# Patient Record
Sex: MALE | Race: WHITE | NOT HISPANIC OR LATINO
[De-identification: names, ages, dates, MRNs, and addresses within clinical notes are randomized per-mention and may not be internally consistent; named-entity substitution may affect disease eponyms.]

---

## 2018-11-13 PROBLEM — Z00.00 ENCOUNTER FOR PREVENTIVE HEALTH EXAMINATION: Status: ACTIVE | Noted: 2018-11-13

## 2019-02-25 ENCOUNTER — RECORD ABSTRACTING (OUTPATIENT)
Age: 79
End: 2019-02-25

## 2019-02-25 DIAGNOSIS — G47.19 OTHER HYPERSOMNIA: ICD-10-CM

## 2019-02-25 DIAGNOSIS — Z78.9 OTHER SPECIFIED HEALTH STATUS: ICD-10-CM

## 2019-02-25 DIAGNOSIS — Z80.9 FAMILY HISTORY OF MALIGNANT NEOPLASM, UNSPECIFIED: ICD-10-CM

## 2019-02-25 DIAGNOSIS — Z82.49 FAMILY HISTORY OF ISCHEMIC HEART DISEASE AND OTHER DISEASES OF THE CIRCULATORY SYSTEM: ICD-10-CM

## 2019-02-25 DIAGNOSIS — Z87.39 PERSONAL HISTORY OF OTHER DISEASES OF THE MUSCULOSKELETAL SYSTEM AND CONNECTIVE TISSUE: ICD-10-CM

## 2019-02-25 DIAGNOSIS — Z87.891 PERSONAL HISTORY OF NICOTINE DEPENDENCE: ICD-10-CM

## 2019-05-03 ENCOUNTER — APPOINTMENT (OUTPATIENT)
Dept: PULMONOLOGY | Facility: CLINIC | Age: 79
End: 2019-05-03
Payer: COMMERCIAL

## 2019-05-03 VITALS
WEIGHT: 193.98 LBS | SYSTOLIC BLOOD PRESSURE: 132 MMHG | OXYGEN SATURATION: 97 % | BODY MASS INDEX: 34.37 KG/M2 | HEART RATE: 58 BPM | DIASTOLIC BLOOD PRESSURE: 60 MMHG | HEIGHT: 63 IN

## 2019-05-03 PROCEDURE — 99215 OFFICE O/P EST HI 40 MIN: CPT

## 2019-05-03 PROCEDURE — G0296 VISIT TO DETERM LDCT ELIG: CPT

## 2019-05-03 NOTE — DISCUSSION/SUMMARY
[FreeTextEntry1] : APAP [X]  30 day [] 90 day compliance Dates:  [4/3-5/2/2019] \par Settings: [APAP 8-16] cm F[]  Airfit Touch [] small []Med []  Lg . [X]  Nasal\par %compliance:[80]  \par Pressure:  95thile%  []cm max []cm\par Average use:  [5 hours 6 minutes] \par Leak: [17] L/ min,  max [32]L/ min   [] L/ hrs  []min\par AHI = [3] \par \par

## 2019-05-03 NOTE — ASSESSMENT
[FreeTextEntry1] : above was discussed at length with the patient was an excellent understanding of the issues\par

## 2019-05-03 NOTE — HISTORY OF PRESENT ILLNESS
[Difficulty Breathing During Exertion] : stable dyspnea on exertion [Feelings Of Weakness On Exertion] : stable exercise intolerance [Cough] : stable coughing [Wheezing] : stable wheezing [Regional Soft Tissue Swelling Both Lower Extremities] : denies lower extremity edema [Fever] : denies fever [Chest Pain Or Discomfort] : denies chest pain [Wt Gain ___ Lbs] : recent [unfilled] ~Upound(s) weight gain [3  -  Moderate] : 3, moderate [Oxygen] : the patient uses no supplemental oxygen [Class III - Marked Limitation in Activity] : III [Former] : is a former smoker [___ Year Quit] : ~He/She~ quit smoking in [unfilled] [___ Pack Year History] : [unfilled] pack year history [URI] : upper respiratory tract infection [de-identified] : Interstitial lung disease increased in the right lower lobe [de-identified] : FEV1 2.09 (89% predicted FEV1/FVC 79, , %, RV/%, DLCO 90% [de-identified] : 2 PPDx 47 years [Obstructive Sleep Apnea] : obstructive sleep apnea [Snoring] : no snoring [Witnessed Apneas] : no witnessed sleep apnea [Frequent Nocturnal Awakening] : no nocturnal awakening [Daytime Somnolence] : no daytime somnolence [ESS: ___] : ESS score [unfilled] [Date: ___] : Date of most recent diagnostic polysomnogram: [unfilled] [Dwight desatuation%: ___] : Dwight desaturation:  [unfilled]% [AHI: ___ per hour] : Apnea-hypopnea index:  [unfilled] per hour [CPAP: ___ cmH2O] : CPAP: [unfilled] cmH2O [FreeTextEntry1] : the patient returns with his wife for followup of his COPD JUVENTINO and obesity. he had a bad cough for approximately 2 weeks but is feeling better now addition he noticed that he gets SOB walking off aspirin certainly very dyspneic walking uphills. he has gained weight despite watching his p.o. intake and decreasing his wine consumption significant. he also complains of abdominal distention at times. e has been compliant since he's been using it.

## 2019-05-03 NOTE — PHYSICAL EXAM
[General Appearance - In No Acute Distress] : no acute distress [Elongated Uvula] : elongated uvula [Erythema] : erythema of the pharynx [Enlarged Base of the Tongue] : enlargement of the base of the tongue [IV] : IV [Heart Rate And Rhythm] : heart rate and rhythm were normal [] : the neck was supple [Neck Appearance] : the appearance of the neck was normal [Auscultation Breath Sounds / Voice Sounds] : lungs were clear to auscultation bilaterally [Respiration, Rhythm And Depth] : normal respiratory rhythm and effort [Abdomen Soft] : soft [Nail Clubbing] : no clubbing of the fingernails [Abnormal Walk] : normal gait [FreeTextEntry1] : No edema [Cyanosis, Localized] : no localized cyanosis [Cranial Nerves] : cranial nerves 2-12 were intact [Deep Tendon Reflexes (DTR)] : deep tendon reflexes were 2+ and symmetric [Skin Color & Pigmentation] : normal skin color and pigmentation [Oriented To Time, Place, And Person] : oriented to person, place, and time

## 2019-06-11 ENCOUNTER — RX RENEWAL (OUTPATIENT)
Age: 79
End: 2019-06-11

## 2019-11-13 ENCOUNTER — RESULT REVIEW (OUTPATIENT)
Age: 79
End: 2019-11-13

## 2019-11-13 ENCOUNTER — APPOINTMENT (OUTPATIENT)
Dept: PULMONOLOGY | Facility: CLINIC | Age: 79
End: 2019-11-13
Payer: COMMERCIAL

## 2019-11-13 VITALS
SYSTOLIC BLOOD PRESSURE: 140 MMHG | BODY MASS INDEX: 34.73 KG/M2 | OXYGEN SATURATION: 97 % | HEIGHT: 63 IN | HEART RATE: 72 BPM | WEIGHT: 196 LBS | DIASTOLIC BLOOD PRESSURE: 62 MMHG

## 2019-11-13 PROCEDURE — 99214 OFFICE O/P EST MOD 30 MIN: CPT

## 2019-11-13 RX ORDER — LEVOFLOXACIN 500 MG/1
500 TABLET, FILM COATED ORAL
Refills: 0 | Status: DISCONTINUED | COMMUNITY
End: 2019-11-13

## 2019-11-13 RX ORDER — PREDNISONE 10 MG/1
10 TABLET ORAL
Refills: 0 | Status: DISCONTINUED | COMMUNITY
End: 2019-11-13

## 2019-11-13 RX ORDER — LEVOTHYROXINE SODIUM 88 UG/1
88 TABLET ORAL
Qty: 90 | Refills: 0 | Status: DISCONTINUED | COMMUNITY
Start: 2019-01-31 | End: 2019-11-13

## 2019-11-13 NOTE — DISCUSSION/SUMMARY
[FreeTextEntry1] : PFT 5/16/19 FEV1- 2.19 (88% predicted) FEV1/FVC 79 no change post BD  RV/% DlCO- 87%

## 2019-11-13 NOTE — HISTORY OF PRESENT ILLNESS
[Improved] : have improved [Difficulty Breathing During Exertion] : improved dyspnea on exertion [Feelings Of Weakness On Exertion] : improved exercise intolerance [Cough] : improved coughing [Wheezing] : improved wheezing [Regional Soft Tissue Swelling Both Lower Extremities] : denies lower extremity edema [Wt Gain ___ Lbs] : recent [unfilled] ~Upound(s) weight gain [Chest Pain Or Discomfort] : denies chest pain [Fever] : denies fever [1  - Very slight] : 1, very slight [Class III - Marked Limitation in Activity] : III [___ Year Quit] : ~He/She~ quit smoking in [unfilled] [___ Pack Year History] : [unfilled] pack year history [URI] : upper respiratory tract infection [Obstructive Sleep Apnea] : obstructive sleep apnea [ESS: ___] : ESS score [unfilled] [Date: ___] : Date of most recent diagnostic polysomnogram: [unfilled] [AHI: ___ per hour] : Apnea-hypopnea index:  [unfilled] per hour [Dwight desatuation%: ___] : Dwight desaturation:  [unfilled]% [CPAP: ___ cmH2O] : CPAP: [unfilled] cmH2O [Oxygen] : the patient uses no supplemental oxygen [de-identified] : Interstitial lung disease increased in the right lower lobe [de-identified] : FEV1 2.09 (89% predicted FEV1/FVC 79, , %, RV/%, DLCO 90% [de-identified] : 2 PPDx 46 years [Snoring] : no snoring [Witnessed Apneas] : no witnessed sleep apnea [Daytime Somnolence] : no daytime somnolence [Frequent Nocturnal Awakening] : no nocturnal awakening [FreeTextEntry1] : THE PATIENT RETURNS WITH HIS WIFE FOR F/U of SOB/COPD AND JUVENTINO.\par He is breathing much better on Incruse. He has been compliant with JUVENTINO and reports "I am happy with my breathing the way it is."  He doesn’t want to change his regimen but does notice his breathing is better if he loses some weight. He and his wife want to know the best diet to lose weight at least to start.

## 2019-11-13 NOTE — REVIEW OF SYSTEMS
[Recent Wt Gain (___ Lbs)] : recent [unfilled] ~Ulb weight gain [Cough] : cough [Dyspnea] : dyspnea [As Noted in HPI] : as noted in HPI [Negative] : Endocrine

## 2019-11-13 NOTE — PHYSICAL EXAM
[General Appearance - In No Acute Distress] : no acute distress [Low Lying Soft Palate] : low lying soft palate [Elongated Uvula] : elongated uvula [Enlarged Base of the Tongue] : enlargement of the base of the tongue [Erythema] : erythema of the pharynx [IV] : IV [Neck Appearance] : the appearance of the neck was normal [] : the neck was supple [Heart Rate And Rhythm] : heart rate and rhythm were normal [Heart Sounds] : normal S1 and S2 [FreeTextEntry1] : distant [Respiration, Rhythm And Depth] : normal respiratory rhythm and effort [Abdomen Soft] : soft [Abnormal Walk] : normal gait [Nail Clubbing] : no clubbing of the fingernails [Cyanosis, Localized] : no localized cyanosis [FreeTextEntry2] : no edema [Skin Color & Pigmentation] : normal skin color and pigmentation [Cranial Nerves] : cranial nerves 2-12 were intact [Deep Tendon Reflexes (DTR)] : deep tendon reflexes were 2+ and symmetric [Sensation] : the sensory exam was normal to light touch and pinprick [Oriented To Time, Place, And Person] : oriented to person, place, and time [Affect] : the affect was normal

## 2020-06-25 ENCOUNTER — APPOINTMENT (OUTPATIENT)
Dept: PULMONOLOGY | Facility: CLINIC | Age: 80
End: 2020-06-25
Payer: COMMERCIAL

## 2020-06-25 VITALS
SYSTOLIC BLOOD PRESSURE: 126 MMHG | HEART RATE: 51 BPM | HEIGHT: 63 IN | WEIGHT: 191 LBS | TEMPERATURE: 98.5 F | OXYGEN SATURATION: 98 % | DIASTOLIC BLOOD PRESSURE: 64 MMHG | BODY MASS INDEX: 33.84 KG/M2

## 2020-06-25 DIAGNOSIS — E03.9 HYPOTHYROIDISM, UNSPECIFIED: ICD-10-CM

## 2020-06-25 PROCEDURE — 99215 OFFICE O/P EST HI 40 MIN: CPT

## 2020-06-25 RX ORDER — LEVOTHYROXINE SODIUM 0.1 MG/1
100 TABLET ORAL
Refills: 0 | Status: ACTIVE | COMMUNITY

## 2020-06-25 NOTE — DISCUSSION/SUMMARY
[FreeTextEntry1] : APAP Compliance;  []  30 day [] 90 day  Dates:  [5/26-6/24/2020] \par Settings: [8-16] cm \par Mask; F[]  Airfit Touch [] small []Med []  Lg . []  Nasal [ ] Pillows\par %compliance:[97]  \par Pressure:  95thile%  [12]cm max [13]cm\par Average use:  [5:43] \par Leak: [31] L/ min,  max [49]L/ min   [] L/ hrs  []min\par AHI = [3] \par

## 2020-06-25 NOTE — HISTORY OF PRESENT ILLNESS
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 He has been more careful about wearing a mask but he remains very active and since she's been wearing a mask his breathing has markedly improved and in addition he has been compliant with InCruse and apap. [TextBox_11] : 2 [TextBox_13] : 47 [YearQuit] : 2004 [TextBox_29] : PFT 5/16/19 FEV1- 2.19 (88% predicted) FEV1/FVC 79 no change post BD  RV/% DlCO- 87%.  [TextBox_100] : 2/15/2018 [TextBox_108] : 40 [TextBox_116] : 79 [TextBox_104] : 03/2018

## 2020-06-25 NOTE — PHYSICAL EXAM
[Low Lying Soft Palate] : low lying soft palate [No Acute Distress] : no acute distress [Elongated Uvula] : elongated uvula [IV] : Mallampati Class: IV [Enlarged Base of the Tongue] : enlarged base of the tongue [Normal Rate/Rhythm] : normal rate/rhythm [No Neck Mass] : no neck mass [Normal Appearance] : normal appearance [No Murmurs] : no murmurs [Normal S1, S2] : normal s1, s2 [No Abnormalities] : no abnormalities [No Resp Distress] : no resp distress [Benign] : benign [Clear to Auscultation Bilaterally] : clear to auscultation bilaterally [No Cyanosis] : no cyanosis [Normal Gait] : normal gait [No Clubbing] : no clubbing [No Edema] : no edema [FROM] : FROM [Normal Color/ Pigmentation] : normal color/ pigmentation [Normal Affect] : normal affect [No Focal Deficits] : no focal deficits [Oriented x3] : oriented x3 [TextBox_2] : Obese

## 2020-07-15 ENCOUNTER — RESULT REVIEW (OUTPATIENT)
Age: 80
End: 2020-07-15

## 2020-11-17 ENCOUNTER — APPOINTMENT (OUTPATIENT)
Dept: PULMONOLOGY | Facility: CLINIC | Age: 80
End: 2020-11-17

## 2021-05-10 ENCOUNTER — APPOINTMENT (OUTPATIENT)
Dept: PULMONOLOGY | Facility: CLINIC | Age: 81
End: 2021-05-10
Payer: MEDICARE

## 2021-05-10 VITALS
DIASTOLIC BLOOD PRESSURE: 70 MMHG | SYSTOLIC BLOOD PRESSURE: 124 MMHG | TEMPERATURE: 98.7 F | BODY MASS INDEX: 34.91 KG/M2 | HEART RATE: 62 BPM | HEIGHT: 63 IN | WEIGHT: 197 LBS | OXYGEN SATURATION: 97 %

## 2021-05-10 DIAGNOSIS — J47.1 BRONCHIECTASIS WITH (ACUTE) EXACERBATION: ICD-10-CM

## 2021-05-10 PROCEDURE — 99214 OFFICE O/P EST MOD 30 MIN: CPT

## 2021-05-12 NOTE — HISTORY OF PRESENT ILLNESS
[Former] : former [>= 30 pack years] : >= 30 pack years [Never] : never [Obstructive Sleep Apnea] : obstructive sleep apnea [Lab] : lab [APAP:] : APAP [TextBox_4] : 81 y/o WM here w/ his wife for a follow-up visit. He reports that he lost 1lb and has no problem on stairs and can walk for multiple miles on flat ground, but steep hills cause him to need to stop and catch his breath which usually takes him just a few seconds and a couple deep breaths. He says that when he is walking a comfortable incline he feels better the longer he walks. He is enjoying the mask and machine and wears it every night. He denies coughing up phlegm.  When they went to fill the Incruse Rx they had to pay $350. [TextBox_11] : 2 [TextBox_13] : 46 [YearQuit] : 2004 [TextBox_29] : PFT 5/16/19 FEV1- 2.19 (88% predicted) FEV1/FVC 79 no change post BD  RV/% DlCO- 87%.  [TextBox_100] : 2/15/2018 [TextBox_108] : 40 [TextBox_104] : 03/2018 [TextBox_116] : 79

## 2021-05-12 NOTE — DISCUSSION/SUMMARY
[FreeTextEntry1] : APAP Compliance, Date: 04/10/2021 - 05/09/2021\par Usage: 22 days (73% Compliance)\par Usage >= 4 hours: 22 days (73%) \par Average use: 6 hours 13 minutes\par Setting 8.0-15.0 cm H2O (95th percentile: 13.7, Maximum: 14.4)\par Leaks - 95th percentile: 34.0 L/min, Max 54.8 L/min\par AHI: 4.5 \par \par PSG 2/5/2019 - AHI: 47.7 Lowest Sat: 87%\par \par APAP Compliance;  []  30 day [] 90 day  Dates:  [5/26-6/24/2020] \par Settings: [8-16] cm \par Mask; F[]  Airfit Touch [] small []Med []  Lg . []  Nasal [ ] Pillows\par %compliance:[97]  \par Pressure:  95thile%  [12]cm max [13]cm\par Average use:  [5:43] \par Leak: [31] L/ min,  max [49]L/ min   [] L/ hrs  []min\par AHI = [3] \par

## 2021-05-12 NOTE — PHYSICAL EXAM
[No Acute Distress] : no acute distress [Low Lying Soft Palate] : low lying soft palate [Elongated Uvula] : elongated uvula [Enlarged Base of the Tongue] : enlarged base of the tongue [IV] : Mallampati Class: IV [Normal Appearance] : normal appearance [No Neck Mass] : no neck mass [Normal Rate/Rhythm] : normal rate/rhythm [Normal S1, S2] : normal s1, s2 [No Murmurs] : no murmurs [No Resp Distress] : no resp distress [Clear to Auscultation Bilaterally] : clear to auscultation bilaterally [No Abnormalities] : no abnormalities [Benign] : benign [Normal Gait] : normal gait [No Clubbing] : no clubbing [No Cyanosis] : no cyanosis [No Edema] : no edema [FROM] : FROM [Normal Color/ Pigmentation] : normal color/ pigmentation [No Focal Deficits] : no focal deficits [Oriented x3] : oriented x3 [Normal Affect] : normal affect [TextBox_2] : Obese

## 2021-05-24 ENCOUNTER — NON-APPOINTMENT (OUTPATIENT)
Age: 81
End: 2021-05-24

## 2021-05-24 ENCOUNTER — APPOINTMENT (OUTPATIENT)
Dept: OPHTHALMOLOGY | Facility: CLINIC | Age: 81
End: 2021-05-24
Payer: MEDICARE

## 2021-05-24 PROCEDURE — 92004 COMPRE OPH EXAM NEW PT 1/>: CPT

## 2021-05-24 PROCEDURE — 92136 OPHTHALMIC BIOMETRY: CPT

## 2021-05-26 ENCOUNTER — TRANSCRIPTION ENCOUNTER (OUTPATIENT)
Age: 81
End: 2021-05-26

## 2021-06-20 ENCOUNTER — TRANSCRIPTION ENCOUNTER (OUTPATIENT)
Age: 81
End: 2021-06-20

## 2021-06-21 ENCOUNTER — OUTPATIENT (OUTPATIENT)
Dept: OUTPATIENT SERVICES | Facility: HOSPITAL | Age: 81
LOS: 1 days | Discharge: HOME | End: 2021-06-21
Payer: MEDICARE

## 2021-06-21 ENCOUNTER — APPOINTMENT (OUTPATIENT)
Dept: OPHTHALMOLOGY | Facility: AMBULATORY SURGERY CENTER | Age: 81
End: 2021-06-21

## 2021-06-21 ENCOUNTER — NON-APPOINTMENT (OUTPATIENT)
Age: 81
End: 2021-06-21

## 2021-06-21 PROCEDURE — 66982 XCAPSL CTRC RMVL CPLX WO ECP: CPT | Mod: LT

## 2021-06-22 ENCOUNTER — APPOINTMENT (OUTPATIENT)
Dept: OPHTHALMOLOGY | Facility: CLINIC | Age: 81
End: 2021-06-22

## 2021-06-28 ENCOUNTER — APPOINTMENT (OUTPATIENT)
Dept: OPHTHALMOLOGY | Facility: CLINIC | Age: 81
End: 2021-06-28
Payer: MEDICARE

## 2021-06-28 ENCOUNTER — NON-APPOINTMENT (OUTPATIENT)
Age: 81
End: 2021-06-28

## 2021-06-28 PROCEDURE — 99024 POSTOP FOLLOW-UP VISIT: CPT

## 2021-07-12 ENCOUNTER — APPOINTMENT (OUTPATIENT)
Dept: OPHTHALMOLOGY | Facility: CLINIC | Age: 81
End: 2021-07-12
Payer: MEDICARE

## 2021-07-12 ENCOUNTER — NON-APPOINTMENT (OUTPATIENT)
Age: 81
End: 2021-07-12

## 2021-07-12 PROCEDURE — 92136 OPHTHALMIC BIOMETRY: CPT

## 2021-07-12 PROCEDURE — 99024 POSTOP FOLLOW-UP VISIT: CPT

## 2021-08-01 ENCOUNTER — TRANSCRIPTION ENCOUNTER (OUTPATIENT)
Age: 81
End: 2021-08-01

## 2021-08-02 ENCOUNTER — OUTPATIENT (OUTPATIENT)
Dept: OUTPATIENT SERVICES | Facility: HOSPITAL | Age: 81
LOS: 1 days | Discharge: HOME | End: 2021-08-02
Payer: MEDICARE

## 2021-08-02 ENCOUNTER — NON-APPOINTMENT (OUTPATIENT)
Age: 81
End: 2021-08-02

## 2021-08-02 ENCOUNTER — APPOINTMENT (OUTPATIENT)
Dept: OPHTHALMOLOGY | Facility: AMBULATORY SURGERY CENTER | Age: 81
End: 2021-08-02

## 2021-08-02 PROCEDURE — 66982 XCAPSL CTRC RMVL CPLX WO ECP: CPT | Mod: RT,79

## 2021-08-09 ENCOUNTER — NON-APPOINTMENT (OUTPATIENT)
Age: 81
End: 2021-08-09

## 2021-08-09 ENCOUNTER — APPOINTMENT (OUTPATIENT)
Dept: OPHTHALMOLOGY | Facility: CLINIC | Age: 81
End: 2021-08-09
Payer: MEDICARE

## 2021-08-09 PROCEDURE — 99024 POSTOP FOLLOW-UP VISIT: CPT

## 2021-08-24 ENCOUNTER — RX RENEWAL (OUTPATIENT)
Age: 81
End: 2021-08-24

## 2021-09-06 ENCOUNTER — RESULT REVIEW (OUTPATIENT)
Age: 81
End: 2021-09-06

## 2021-09-09 ENCOUNTER — RESULT REVIEW (OUTPATIENT)
Age: 81
End: 2021-09-09

## 2021-09-13 ENCOUNTER — NON-APPOINTMENT (OUTPATIENT)
Age: 81
End: 2021-09-13

## 2021-09-13 ENCOUNTER — APPOINTMENT (OUTPATIENT)
Dept: OPHTHALMOLOGY | Facility: CLINIC | Age: 81
End: 2021-09-13
Payer: MEDICARE

## 2021-09-13 PROCEDURE — 99024 POSTOP FOLLOW-UP VISIT: CPT

## 2021-10-14 ENCOUNTER — APPOINTMENT (OUTPATIENT)
Dept: PULMONOLOGY | Facility: CLINIC | Age: 81
End: 2021-10-14
Payer: MEDICARE

## 2021-10-14 ENCOUNTER — NON-APPOINTMENT (OUTPATIENT)
Age: 81
End: 2021-10-14

## 2021-10-14 VITALS
BODY MASS INDEX: 34.55 KG/M2 | HEART RATE: 74 BPM | SYSTOLIC BLOOD PRESSURE: 134 MMHG | WEIGHT: 195 LBS | HEIGHT: 63 IN | OXYGEN SATURATION: 96 % | TEMPERATURE: 98.6 F | DIASTOLIC BLOOD PRESSURE: 60 MMHG

## 2021-10-14 PROCEDURE — 99214 OFFICE O/P EST MOD 30 MIN: CPT

## 2021-10-14 NOTE — DISCUSSION/SUMMARY
[FreeTextEntry1] : All images and report reviewed by me in the office \par Chest CT 9/9/2021: Mild ILD unchanged. No suspicious lesions. \par \par PFTs 09/09/2021 Actual FEV1 2.05 (FEV1 Pred 85%), FEV1/FVC(%) - 79, RV 67%, TLC 74%, RV/TLC 92%, DLCO 78% \par 6MWT 9/9/2021: No sig desat post 6 min on RA. + increased HR response (80 -> 106).\par \par APAP Compliance, Date: 04/10/2021 - 05/09/2021\par Usage: 22 days (73% Compliance)\par Usage >= 4 hours: 22 days (73%) \par Average use: 6 hours 13 minutes\par Setting 8.0-15.0 cm H2O (95th percentile: 13.7, Maximum: 14.4)\par Leaks - 95th percentile: 34.0 L/min, Max 54.8 L/min\par AHI: 4.5 \par \par PSG 2/5/2019 - AHI: 47.7 Lowest Sat: 87%\par \par APAP Compliance;  []  30 day [] 90 day  Dates:  [5/26-6/24/2020] \par Settings: [8-16] cm \par Mask; F[]  Airfit Touch [] small []Med []  Lg . []  Nasal [ ] Pillows\par %compliance:[97]  \par Pressure:  95thile%  [12]cm max [13]cm\par Average use:  [5:43] \par Leak: [31] L/ min,  max [49]L/ min   [] L/ hrs  []min\par AHI = [3] \par

## 2021-10-14 NOTE — HISTORY OF PRESENT ILLNESS
[Former] : former [>= 30 pack years] : >= 30 pack years [Never] : never [Obstructive Sleep Apnea] : obstructive sleep apnea [Lab] : lab [APAP:] : APAP [TextBox_4] : Patient returns w/ his wife on a f/u for COPD and his breathing is not bad. He sometimes feels tightness when he walks far but this only happens once in a while. Incruse worked better for him, and he is still using it. He was staying compliant w/ CPAP until his mask broke and was leaking significantly. He will dry the Dreamwear full face next. He denies dry mouth. His weight has been fluctuating and he will work on losing more weight. [TextBox_11] : 2 [TextBox_13] : 47 [YearQuit] : 2004 [TextBox_29] : PFT 5/16/19 FEV1- 2.19 (88% predicted) FEV1/FVC 79 no change post BD  RV/% DlCO- 87%.  [TextBox_100] : 2/15/2018 [TextBox_108] : 40 [TextBox_116] : 79 [TextBox_104] : 03/2018

## 2021-10-14 NOTE — PHYSICAL EXAM
[No Acute Distress] : no acute distress [Low Lying Soft Palate] : low lying soft palate [Elongated Uvula] : elongated uvula [Enlarged Base of the Tongue] : enlarged base of the tongue [IV] : Mallampati Class: IV [Normal Appearance] : normal appearance [No Neck Mass] : no neck mass [Normal Rate/Rhythm] : normal rate/rhythm [Normal S1, S2] : normal s1, s2 [No Murmurs] : no murmurs [No Resp Distress] : no resp distress [Clear to Auscultation Bilaterally] : clear to auscultation bilaterally [No Abnormalities] : no abnormalities [Benign] : benign [Normal Gait] : normal gait [No Clubbing] : no clubbing [No Cyanosis] : no cyanosis [No Edema] : no edema [FROM] : FROM [Normal Color/ Pigmentation] : normal color/ pigmentation [No Focal Deficits] : no focal deficits [Oriented x3] : oriented x3 [Normal Affect] : normal affect [Nasal congestion] : nasal congestion [TextBox_2] : Obese [TextBox_11] : deviated septum [TextBox_89] : obese

## 2022-02-22 ENCOUNTER — RX RENEWAL (OUTPATIENT)
Age: 82
End: 2022-02-22

## 2022-04-12 ENCOUNTER — APPOINTMENT (OUTPATIENT)
Dept: PULMONOLOGY | Facility: CLINIC | Age: 82
End: 2022-04-12
Payer: MEDICARE

## 2022-04-12 VITALS
HEART RATE: 71 BPM | WEIGHT: 194 LBS | OXYGEN SATURATION: 97 % | DIASTOLIC BLOOD PRESSURE: 66 MMHG | BODY MASS INDEX: 34.38 KG/M2 | TEMPERATURE: 95.6 F | SYSTOLIC BLOOD PRESSURE: 138 MMHG | HEIGHT: 63 IN

## 2022-04-12 PROCEDURE — 99214 OFFICE O/P EST MOD 30 MIN: CPT | Mod: CS

## 2022-04-13 NOTE — PHYSICAL EXAM
[No Acute Distress] : no acute distress [Low Lying Soft Palate] : low lying soft palate [Elongated Uvula] : elongated uvula [Enlarged Base of the Tongue] : enlarged base of the tongue [Nasal congestion] : nasal congestion [IV] : Mallampati Class: IV [Normal Appearance] : normal appearance [No Neck Mass] : no neck mass [Normal Rate/Rhythm] : normal rate/rhythm [Normal S1, S2] : normal s1, s2 [No Murmurs] : no murmurs [No Resp Distress] : no resp distress [Clear to Auscultation Bilaterally] : clear to auscultation bilaterally [No Abnormalities] : no abnormalities [Benign] : benign [Normal Gait] : normal gait [No Clubbing] : no clubbing [No Cyanosis] : no cyanosis [No Edema] : no edema [FROM] : FROM [Normal Color/ Pigmentation] : normal color/ pigmentation [No Focal Deficits] : no focal deficits [Oriented x3] : oriented x3 [Normal Affect] : normal affect [TextBox_2] : Obese [TextBox_11] : DS, mild nasal congestion, crowded o/p [TextBox_89] : obese

## 2022-04-13 NOTE — DISCUSSION/SUMMARY
[FreeTextEntry1] : All images and report reviewed by me in the office \par Chest CT 9/9/2021: Mild ILD unchanged. No suspicious lesions. \par \par All images and report reviewed by me in the office \par APAP Compliance, Date: 03/13/2022 - 04/11/2022\par Usage: 22 days (73% Compliance)\par Usage >= 4 hours: 21 days (70%) \par Average use: 4 hours 25 minutes\par Setting 8.0-15.0 cm H2O (95th percentile: 14.3, Maximum: 14.8)\par Leaks - 95th percentile: 23.3 L/min, Max 42.4 L/min\par AHI: 4.6\par \par PFTs 09/09/2021 Actual FEV1 2.05 (FEV1 Pred 85%), FEV1/FVC(%) - 79, RV 67%, TLC 74%, RV/TLC 92%, DLCO 78% \par 6MWT 9/9/2021: No sig desat post 6 min on RA. + increased HR response (80 -> 106).\par \par APAP Compliance, Date: 04/10/2021 - 05/09/2021\par Usage: 22 days (73% Compliance)\par Usage >= 4 hours: 22 days (73%) \par Average use: 6 hours 13 minutes\par Setting 8.0-15.0 cm H2O (95th percentile: 13.7, Maximum: 14.4)\par Leaks - 95th percentile: 34.0 L/min, Max 54.8 L/min\par AHI: 4.5 \par \par PSG 2/5/2019 - AHI: 47.7 Lowest Sat: 87%\par \par APAP Compliance;  []  30 day [] 90 day  Dates:  [5/26-6/24/2020] \par Settings: [8-16] cm \par Mask; F[]  Airfit Touch [] small []Med []  Lg . []  Nasal [ ] Pillows\par %compliance:[97]  \par Pressure:  95thile%  [12]cm max [13]cm\par Average use:  [5:43] \par Leak: [31] L/ min,  max [49]L/ min   [] L/ hrs  []min\par AHI = [3] \par

## 2022-04-13 NOTE — HISTORY OF PRESENT ILLNESS
[Former] : former [Never] : never [Obstructive Sleep Apnea] : obstructive sleep apnea [Lab] : lab [APAP:] : APAP [TextBox_4] : Patient returns w/ his wife on a f/u for COPD. He states his breathing has been fine and he hasn't had any problems walking. Incruse and CPAP have been working well for him. He is using the full face mask because the nasal mask did not work for him. C/o occasional leakage w/ the full face but for the most part he likes that one better. He and his wife had COVID roughly 2 months ago. He experienced body aches, headaches, chest tightness. Sx lasted a little over 2 weeks but he has had no problems since then. His weight has been "up and down." Denies heartburn, wheeze, or any other sx in general. \par  [TextBox_11] : 2 [TextBox_13] : 46 [YearQuit] : 2004 [TextBox_29] : PFT 5/16/19 FEV1- 2.19 (88% predicted) FEV1/FVC 79 no change post BD  RV/% DlCO- 87%.  [TextBox_100] : 2/15/2018 [TextBox_108] : 40 [TextBox_116] : 79 [TextBox_104] : 03/2018

## 2022-10-10 ENCOUNTER — RX RENEWAL (OUTPATIENT)
Age: 82
End: 2022-10-10

## 2022-10-13 ENCOUNTER — APPOINTMENT (OUTPATIENT)
Dept: PULMONOLOGY | Facility: CLINIC | Age: 82
End: 2022-10-13

## 2022-10-13 ENCOUNTER — RX RENEWAL (OUTPATIENT)
Age: 82
End: 2022-10-13

## 2022-10-13 VITALS
BODY MASS INDEX: 33.66 KG/M2 | OXYGEN SATURATION: 97 % | DIASTOLIC BLOOD PRESSURE: 52 MMHG | SYSTOLIC BLOOD PRESSURE: 126 MMHG | TEMPERATURE: 95.6 F | WEIGHT: 190 LBS | HEART RATE: 61 BPM | HEIGHT: 63 IN

## 2022-10-13 DIAGNOSIS — Z86.19 PERSONAL HISTORY OF OTHER INFECTIOUS AND PARASITIC DISEASES: ICD-10-CM

## 2022-10-13 DIAGNOSIS — U07.1 COVID-19: ICD-10-CM

## 2022-10-13 DIAGNOSIS — D64.9 ANEMIA, UNSPECIFIED: ICD-10-CM

## 2022-10-13 DIAGNOSIS — R93.89 ABNORMAL FINDINGS ON DIAGNOSTIC IMAGING OF OTHER SPECIFIED BODY STRUCTURES: ICD-10-CM

## 2022-10-13 DIAGNOSIS — J47.9 BRONCHIECTASIS, UNCOMPLICATED: ICD-10-CM

## 2022-10-13 DIAGNOSIS — J84.9 INTERSTITIAL PULMONARY DISEASE, UNSPECIFIED: ICD-10-CM

## 2022-10-13 PROCEDURE — 99214 OFFICE O/P EST MOD 30 MIN: CPT | Mod: CS

## 2022-10-13 NOTE — DISCUSSION/SUMMARY
[FreeTextEntry1] : APAP Compliance, Date: 09/12/2022 - 10/11/2022\par Usage: 30 days (100% Compliance)\par Usage >= 4 hours: 26 days (87%) \par Average use: 5 hours 32 minutes\par Setting 8.0-15.0 cm H2O (95th percentile: 13.6, Maximum: 14.1)\par Leaks - 95th percentile: 22.1 L/min, Max 34.1 L/min\par AHI: 2.8\par \par APAP Compliance, Date: 03/13/2022 - 04/11/2022\par Usage: 22 days (73% Compliance)\par Usage >= 4 hours: 21 days (70%) \par Average use: 4 hours 25 minutes\par Setting 8.0-15.0 cm H2O (95th percentile: 14.3, Maximum: 14.8)\par Leaks - 95th percentile: 23.3 L/min, Max 42.4 L/min\par AHI: 4.6\par \par APAP Compliance, Date: 04/10/2021 - 05/09/2021\par Usage: 22 days (73% Compliance)\par Usage >= 4 hours: 22 days (73%) \par Average use: 6 hours 13 minutes\par Setting 8.0-15.0 cm H2O (95th percentile: 13.7, Maximum: 14.4)\par Leaks - 95th percentile: 34.0 L/min, Max 54.8 L/min\par AHI: 4.5 \par \par APAP Compliance;  []  30 day [] 90 day  Dates:  [5/26-6/24/2020] \par Settings: [8-16] cm \par Mask; F[]  Airfit Touch [] small []Med []  Lg . []  Nasal [ ] Pillows\par %compliance:[97]  \par Pressure:  95thile%  [12]cm max [13]cm\par Average use:  [5:43] \par Leak: [31] L/ min,  max [49]L/ min   [] L/ hrs  []min\par AHI = [3] \par \par PSG 2/5/2019 - AHI: 47.7 Lowest Sat: 87%\par

## 2022-10-13 NOTE — REVIEW OF SYSTEMS
[Recent Wt Loss (___ Lbs)] : ~T recent [unfilled] lb weight loss [Negative] : Endocrine [TextBox_30] : 3 weeks ago had rsv virus

## 2022-10-13 NOTE — ASSESSMENT
[FreeTextEntry1] : above was discussed at length with the patient and his wife who have an excellent understanding of the issues. I informed the patient that this may be our last visit as I am leaving the practice at the end of December.

## 2022-10-13 NOTE — HISTORY OF PRESENT ILLNESS
[Former] : former [Never] : never [Obstructive Sleep Apnea] : obstructive sleep apnea [Lab] : lab [APAP:] : APAP [TextBox_4] : Patient returns w/ his wife on a f/u of his COPD and JUVENTINO. Patient states he is breathing well and has no respiratory problems right now. He does c/o feeling a little weak/ tired currently but he does not believe it is from his breathing. He had a case of RSV about 1 month ago but his breathing was never a problem while he had it. His main RSV symptom was persistent cough w/ yellow phlegm. He began feeling weakness in his LE after the infection. Patient also reports having his blood drawn 3 days ago, which may be contributing to his weakness. He denies current cough or pain. He has been compliant w/ his CPAP machine but is in need of new equipment. He has lost 4 lbs since 4/12 (194 -> 190).\par \par  [TextBox_11] : 2 [TextBox_13] : 46 [YearQuit] : 2004 [TextBox_29] : PFT 5/16/19 FEV1- 2.19 (88% predicted) FEV1/FVC 79 no change post BD  RV/% DlCO- 87%.  [TextBox_100] : 2/15/2018 [TextBox_108] : 40 [TextBox_116] : 79 [TextBox_104] : 03/2018

## 2022-10-13 NOTE — PROCEDURE
[FreeTextEntry1] : All images and report reviewed by me in the office \par Chest CT 9/9/2021: Mild ILD unchanged. No suspicious lesions. \par PFTs 09/09/2021 Actual FEV1 2.05 (FEV1 Pred 85%), FEV1/FVC(%) - 79, RV 67%, TLC 74%, RV/TLC 92%, DLCO 78% \par 6MWT 9/9/2021: No sig desat post 6 min on RA. + increased HR response (80 -> 106).

## 2023-04-17 ENCOUNTER — APPOINTMENT (OUTPATIENT)
Dept: PULMONOLOGY | Facility: CLINIC | Age: 83
End: 2023-04-17
Payer: MEDICARE

## 2023-04-17 VITALS
BODY MASS INDEX: 35.08 KG/M2 | SYSTOLIC BLOOD PRESSURE: 140 MMHG | TEMPERATURE: 96.5 F | HEART RATE: 70 BPM | OXYGEN SATURATION: 96 % | DIASTOLIC BLOOD PRESSURE: 80 MMHG | WEIGHT: 198 LBS | HEIGHT: 63 IN

## 2023-04-17 DIAGNOSIS — J44.9 CHRONIC OBSTRUCTIVE PULMONARY DISEASE, UNSPECIFIED: ICD-10-CM

## 2023-04-17 PROCEDURE — 99214 OFFICE O/P EST MOD 30 MIN: CPT

## 2023-04-17 NOTE — PHYSICAL EXAM
[No Acute Distress] : no acute distress [Well Developed] : well developed [Well-Appearing] : well-appearing [Normal Sclera/Conjunctiva] : normal sclera/conjunctiva [PERRL] : pupils equal round and reactive to light [EOMI] : extraocular movements intact [Normal Outer Ear/Nose] : the outer ears and nose were normal in appearance [Normal Oropharynx] : the oropharynx was normal [No JVD] : no jugular venous distention [No Lymphadenopathy] : no lymphadenopathy [Supple] : supple [Thyroid Normal, No Nodules] : the thyroid was normal and there were no nodules present [No Respiratory Distress] : no respiratory distress  [No Accessory Muscle Use] : no accessory muscle use [Clear to Auscultation] : lungs were clear to auscultation bilaterally [Normal Rate] : normal rate  [Regular Rhythm] : with a regular rhythm [Normal S1, S2] : normal S1 and S2 [No Murmur] : no murmur heard [No Carotid Bruits] : no carotid bruits [No Abdominal Bruit] : a ~M bruit was not heard ~T in the abdomen [No Varicosities] : no varicosities [Pedal Pulses Present] : the pedal pulses are present [No Edema] : there was no peripheral edema [No Palpable Aorta] : no palpable aorta [No Extremity Clubbing/Cyanosis] : no extremity clubbing/cyanosis [Soft] : abdomen soft [Non Tender] : non-tender [Non-distended] : non-distended [No Masses] : no abdominal mass palpated [No HSM] : no HSM [Normal Bowel Sounds] : normal bowel sounds [Normal Posterior Cervical Nodes] : no posterior cervical lymphadenopathy [Normal Anterior Cervical Nodes] : no anterior cervical lymphadenopathy [No Focal Deficits] : no focal deficits [Deep Tendon Reflexes (DTR)] : deep tendon reflexes were 2+ and symmetric [Normal Affect] : the affect was normal [Normal Insight/Judgement] : insight and judgment were intact [de-identified] : obese

## 2023-04-17 NOTE — HISTORY OF PRESENT ILLNESS
[FreeTextEntry1] : Follow-up former smoker and history of obstructive sleep apnea syndrome. [de-identified] :   Patient is a former 1 pack/day smoker but quit 20 years ago.  He is on Incruse..  His breathing is quite good.  He gets slight shortness of breath up hills but no shortness of breath on level ground.  No cough or wheezing.  His weight is unchanged at 198.  He has a history of severe obstructive sleep apnea syndrome on AutoPap.  He was in [place] for the past 3 months and was moving around a lot.  He did not use CPAP for.  Up 2 weeks in [place].  Therefore his compliance report reviewed showed he used CPAP for more than 4 hours 38 out of 60 days with an average usage of 6 hours and 55 minutes.  AHI was 6.1.  He currently is back in [place] and is using CPAP regularly.  He denies excess daytime sleepiness.  His machine is over 5 years old and he would like a new machine.

## 2023-04-17 NOTE — ASSESSMENT
[FreeTextEntry1] :   Patient with stable respiratory status.  We will try to get patient a new CPAP unit.  Patient is advised to use CPAP regularly and attempt to lose weight.

## 2023-05-03 ENCOUNTER — NON-APPOINTMENT (OUTPATIENT)
Age: 83
End: 2023-05-03

## 2023-05-03 ENCOUNTER — APPOINTMENT (OUTPATIENT)
Dept: VASCULAR SURGERY | Facility: CLINIC | Age: 83
End: 2023-05-03
Payer: MEDICARE

## 2023-05-03 VITALS — SYSTOLIC BLOOD PRESSURE: 148 MMHG | HEART RATE: 67 BPM | DIASTOLIC BLOOD PRESSURE: 75 MMHG

## 2023-05-03 DIAGNOSIS — E78.00 PURE HYPERCHOLESTEROLEMIA, UNSPECIFIED: ICD-10-CM

## 2023-05-03 PROCEDURE — 99204 OFFICE O/P NEW MOD 45 MIN: CPT

## 2023-05-03 RX ORDER — ROSUVASTATIN CALCIUM 10 MG/1
10 TABLET, FILM COATED ORAL
Qty: 90 | Refills: 3 | Status: ACTIVE | COMMUNITY
Start: 2023-05-03 | End: 1900-01-01

## 2023-05-03 NOTE — DATA REVIEWED
[FreeTextEntry1] : Bilateral carotid duplex - 50-69% stenosis on the right, no significant stenosis on the left

## 2023-05-03 NOTE — ASSESSMENT
[FreeTextEntry1] : 81 yo male with asymptomatic carotid stenosis. He developed visual symptoms which have now resolved. The patient underwent a carotid duplex that demonstrated a 50 - 69% stenosis on the right and no significant disease on the left. Given the bilateral symptoms and the absence of a lesion on the left  I do not believe his symptoms are  secondary to carotid disease. The patient was instructed to start Crestor 10 mg and aspirin 81 mg (vague remote allergy - he reports no improvement in fever with Aspirin) as prescribed. I discussed my plan with  and he agrees.\par \par A message was also left for the patient's opthalmologist who is also his son. \par \par  He will follow up with  in 1 month for blood work and with me in 6 months for a repeat carotid duplex.

## 2023-05-03 NOTE — REASON FOR VISIT
[Initial Evaluation] : an initial evaluation [Spouse] : spouse [FreeTextEntry1] : Right Carotid Stenosis and bilateral visual symptoms

## 2023-05-03 NOTE — REVIEW OF SYSTEMS
[As Noted in HPI] : as noted in HPI [Fever] : no fever [Chills] : no chills [Leg Claudication] : no intermittent leg claudication [Lower Ext Edema] : no extremity edema [Joint Swelling] : no joint swelling [Joint Stiffness] : no joint stiffness [Limb Pain] : no limb pain [Limb Swelling] : no limb swelling

## 2023-05-03 NOTE — HISTORY OF PRESENT ILLNESS
[FreeTextEntry1] : 82 year-old male, referred by , presents for an initial evaluation of a right carotid stenosis. The patient reports that he developed visual disturbances in  both of his eyes when he woke up. The patient reports that the symptoms resolved when he sat up. He reports that his symptoms have resolved. He underwent a bilateral carotid duplex that demonstrated mild disease on the right and no significant disease on the left. The patient does not currently take a statin or aspirin. Patient is a former smoker but quit about 15 years ago. The patient denies a history of DM. He denies amaurosis fugax, motor or sensory deficits.

## 2023-05-03 NOTE — PHYSICAL EXAM
[Normal Breath Sounds] : Normal breath sounds [Calm] : calm [Ankle Swelling Bilaterally] : bilaterally  [2+] : left 2+ [Varicose Veins Of Lower Extremities] : present [] : present [JVD] : no jugular venous distention  [Ankle Swelling (On Exam)] : not present [de-identified] : Awake and Alert. [de-identified] : No gross motor or sensory deficits. [de-identified] : Appropriate affect.

## 2023-05-09 ENCOUNTER — RX RENEWAL (OUTPATIENT)
Age: 83
End: 2023-05-09

## 2023-09-11 ENCOUNTER — NON-APPOINTMENT (OUTPATIENT)
Age: 83
End: 2023-09-11

## 2023-09-11 ENCOUNTER — APPOINTMENT (OUTPATIENT)
Dept: PULMONOLOGY | Facility: CLINIC | Age: 83
End: 2023-09-11
Payer: MEDICARE

## 2023-09-11 VITALS
OXYGEN SATURATION: 98 % | DIASTOLIC BLOOD PRESSURE: 70 MMHG | WEIGHT: 198 LBS | HEART RATE: 78 BPM | SYSTOLIC BLOOD PRESSURE: 130 MMHG | HEIGHT: 63 IN | BODY MASS INDEX: 35.08 KG/M2

## 2023-09-11 DIAGNOSIS — E66.9 OBESITY, UNSPECIFIED: ICD-10-CM

## 2023-09-11 PROCEDURE — 99214 OFFICE O/P EST MOD 30 MIN: CPT | Mod: 25

## 2023-09-11 PROCEDURE — 94010 BREATHING CAPACITY TEST: CPT

## 2023-10-18 ENCOUNTER — APPOINTMENT (OUTPATIENT)
Dept: PULMONOLOGY | Facility: CLINIC | Age: 83
End: 2023-10-18
Payer: MEDICARE

## 2023-10-18 VITALS
HEART RATE: 77 BPM | SYSTOLIC BLOOD PRESSURE: 140 MMHG | WEIGHT: 198 LBS | OXYGEN SATURATION: 98 % | BODY MASS INDEX: 35.08 KG/M2 | HEIGHT: 63 IN | DIASTOLIC BLOOD PRESSURE: 80 MMHG

## 2023-10-18 PROCEDURE — 99213 OFFICE O/P EST LOW 20 MIN: CPT

## 2023-11-08 ENCOUNTER — APPOINTMENT (OUTPATIENT)
Dept: VASCULAR SURGERY | Facility: CLINIC | Age: 83
End: 2023-11-08
Payer: MEDICARE

## 2023-11-08 VITALS
BODY MASS INDEX: 35.08 KG/M2 | SYSTOLIC BLOOD PRESSURE: 156 MMHG | WEIGHT: 198 LBS | DIASTOLIC BLOOD PRESSURE: 73 MMHG | HEIGHT: 63 IN | HEART RATE: 79 BPM

## 2023-11-08 DIAGNOSIS — I65.22 OCCLUSION AND STENOSIS OF LEFT CAROTID ARTERY: ICD-10-CM

## 2023-11-08 PROCEDURE — 99214 OFFICE O/P EST MOD 30 MIN: CPT

## 2023-11-08 PROCEDURE — 93880 EXTRACRANIAL BILAT STUDY: CPT

## 2023-11-09 ENCOUNTER — RESULT REVIEW (OUTPATIENT)
Age: 83
End: 2023-11-09

## 2024-01-18 ENCOUNTER — APPOINTMENT (OUTPATIENT)
Dept: PULMONOLOGY | Facility: CLINIC | Age: 84
End: 2024-01-18
Payer: MEDICARE

## 2024-01-18 VITALS
DIASTOLIC BLOOD PRESSURE: 80 MMHG | SYSTOLIC BLOOD PRESSURE: 130 MMHG | HEIGHT: 63 IN | OXYGEN SATURATION: 100 % | HEART RATE: 82 BPM | BODY MASS INDEX: 35.08 KG/M2 | WEIGHT: 198 LBS

## 2024-01-18 PROCEDURE — 99213 OFFICE O/P EST LOW 20 MIN: CPT

## 2024-01-18 NOTE — HISTORY OF PRESENT ILLNESS
[TextBox_4] : Since patient got his new CPAP unit he has had problems.  First he was having abdominal bloating.  Then he started complaining that his nose is congested and he feels like he has a cold all the time.  He therefore stopped using CPAP about a month ago.  His breathing is fine.  He sleeps from 12-7 and wakes up about twice per night for short period of time.  He is not excessively sleepy when he does not wear them machine.  He would like to stop CPAP.  He would like to know what happens if he stopped CPAP.  He is on AutoPap 8/15.

## 2024-01-18 NOTE — ASSESSMENT
[FreeTextEntry1] : Patient is noncompliant with CPAP therapy.  He complains of nasal congestion and a dry mouth.  I will ask the representative from the [employer] company to speak to the patient and have technician see the patient to see if there is any remedial problems.  Patient instructed is to start the use of Flonase.  He will return to see me in 3 months.

## 2024-04-01 RX ORDER — UMECLIDINIUM 62.5 UG/1
62.5 AEROSOL, POWDER ORAL
Qty: 30 | Refills: 5 | Status: ACTIVE | COMMUNITY
Start: 2019-06-11 | End: 1900-01-01

## 2024-04-29 ENCOUNTER — APPOINTMENT (OUTPATIENT)
Dept: PULMONOLOGY | Facility: CLINIC | Age: 84
End: 2024-04-29
Payer: MEDICARE

## 2024-04-29 VITALS
SYSTOLIC BLOOD PRESSURE: 120 MMHG | HEIGHT: 63 IN | OXYGEN SATURATION: 95 % | HEART RATE: 80 BPM | DIASTOLIC BLOOD PRESSURE: 70 MMHG | BODY MASS INDEX: 35.08 KG/M2 | WEIGHT: 198 LBS

## 2024-04-29 DIAGNOSIS — G47.33 OBSTRUCTIVE SLEEP APNEA (ADULT) (PEDIATRIC): ICD-10-CM

## 2024-04-29 PROCEDURE — 99213 OFFICE O/P EST LOW 20 MIN: CPT

## 2024-04-29 NOTE — HISTORY OF PRESENT ILLNESS
[TextBox_4] : Of hisPatient with severe sleep apnea.  AHI in the sleep study was 47.  Patient was totally noncompliant with CPAP.  He states it was very uncomfortable with dryness nose and mouth.  This was despite the use of maximum humidity.  He also had a lot of abdominal bloating he was very uncomfortable and stopped using it.  Overall he is sleeping well from 11 PM to 7 AM.  He wakes up for short time once per night.  His wife states he is not snoring as loudly as he used to.  He sleeps with 2 pillows.  There is been no weight change and he now weighs 200 pounds.  He is seeing endocrine and was recommended to try medication for weight loss.

## 2024-04-29 NOTE — ASSESSMENT
[FreeTextEntry1] : Patient is noncompliant with the use of CPAP.  I feel the best option at this point is for him to start medication to help him lose weight.  This would help his sleep apnea.  Patient is essentially asymptomatic in terms of sleep apnea.  I would like to see the patient again in 6 months to see how he is doing in terms of weight reduction.

## 2024-04-29 NOTE — PHYSICAL EXAM
[No Acute Distress] : no acute distress [Normal Oropharynx] : normal oropharynx [IV] : Mallampati Class: IV [Normal Appearance] : normal appearance [No Neck Mass] : no neck mass [Normal Rate/Rhythm] : normal rate/rhythm [Normal S1, S2] : normal s1, s2 [No Murmurs] : no murmurs [No Resp Distress] : no resp distress [Clear to Auscultation Bilaterally] : clear to auscultation bilaterally [No Abnormalities] : no abnormalities [Benign] : benign [Normal Gait] : normal gait [No Clubbing] : no clubbing [No Cyanosis] : no cyanosis [FROM] : FROM [1+ Pitting] : 1+ pitting [Normal Color/ Pigmentation] : normal color/ pigmentation [No Focal Deficits] : no focal deficits [Oriented x3] : oriented x3 [Normal Affect] : normal affect

## 2024-05-07 RX ORDER — ROSUVASTATIN CALCIUM 10 MG/1
10 TABLET, FILM COATED ORAL
Qty: 90 | Refills: 3 | Status: ACTIVE | COMMUNITY
Start: 2024-05-07 | End: 1900-01-01

## 2024-05-22 ENCOUNTER — APPOINTMENT (OUTPATIENT)
Dept: VASCULAR SURGERY | Facility: CLINIC | Age: 84
End: 2024-05-22
Payer: MEDICARE

## 2024-05-22 VITALS
SYSTOLIC BLOOD PRESSURE: 118 MMHG | DIASTOLIC BLOOD PRESSURE: 69 MMHG | WEIGHT: 198 LBS | HEART RATE: 76 BPM | HEIGHT: 63 IN | BODY MASS INDEX: 35.08 KG/M2

## 2024-05-22 DIAGNOSIS — I65.21 OCCLUSION AND STENOSIS OF RIGHT CAROTID ARTERY: ICD-10-CM

## 2024-05-22 PROCEDURE — 93880 EXTRACRANIAL BILAT STUDY: CPT

## 2024-05-22 PROCEDURE — 99214 OFFICE O/P EST MOD 30 MIN: CPT

## 2024-05-22 NOTE — DATA REVIEWED
[FreeTextEntry1] : Bilateral carotid duplex 5/22/24- personally reviewed- Moderate stenosis on the right and mild stenosis on the left.

## 2024-05-22 NOTE — END OF VISIT
[FreeTextEntry3] : All medical record entries made by the Ankuribe were at my, KRUPA HELM, direction and personally dictated by me on 5/22/2024. I have reviewed the chart and agree that the record accurately reflects my personal performance of the history, physical exam, assessment and plan. I have also personally directed, reviewed, and agreed with the chart.

## 2024-05-22 NOTE — ASSESSMENT
[FreeTextEntry1] : 84 yo male with asymptomatic carotid stenosis. He continues to deny amaurosis fugax, motor or sensory deficits.  The patient underwent a repeat carotid duplex that demonstrated moderate stenosis on the right side and mild stenosis on the left side. Patient instructed to follow up for a surveillance duplex in 6 months. He will continue Crestor 10 mg. I encouraged him to start taking an Aspirin 81 mg daily.

## 2024-05-22 NOTE — PHYSICAL EXAM
[Normal Breath Sounds] : Normal breath sounds [Ankle Swelling Bilaterally] : bilaterally  [Varicose Veins Of Lower Extremities] : present [] : present [Calm] : calm [2+] : left 2+ [JVD] : no jugular venous distention  [Ankle Swelling (On Exam)] : not present [de-identified] : Awake and Alert. [de-identified] : No gross motor or sensory deficits. [de-identified] : Appropriate affect.

## 2024-05-22 NOTE — HISTORY OF PRESENT ILLNESS
[FreeTextEntry1] : 83 year-old male, referred by , presents for an initial evaluation of a right carotid stenosis. The patient reports that he developed visual disturbances in  both of his eyes when he woke up. The patient reports that the symptoms resolved when he sat up. He reports that his symptoms have resolved. He underwent a bilateral carotid duplex that demonstrated mild disease on the right and no significant disease on the left. The patient does not currently take a statin or aspirin. Patient is a former smoker but quit about 15 years ago. The patient denies a history of DM. He denies amaurosis fugax, motor or sensory deficits.   [de-identified] : 82 yo male returns in follow up for an asymptomatic right carotid stenosis. The patient  denies amaurosis fugax and motor or sensory deficits. The patient continues to  take a statin as prescribed.  The patient is s/p a carotid duplex. He presents to discuss the results and additional treatment options.

## 2024-10-14 ENCOUNTER — APPOINTMENT (OUTPATIENT)
Dept: PULMONOLOGY | Facility: CLINIC | Age: 84
End: 2024-10-14
Payer: MEDICARE

## 2024-10-14 VITALS
OXYGEN SATURATION: 97 % | SYSTOLIC BLOOD PRESSURE: 118 MMHG | DIASTOLIC BLOOD PRESSURE: 70 MMHG | HEIGHT: 63 IN | WEIGHT: 189 LBS | BODY MASS INDEX: 33.49 KG/M2

## 2024-10-14 DIAGNOSIS — G47.33 OBSTRUCTIVE SLEEP APNEA (ADULT) (PEDIATRIC): ICD-10-CM

## 2024-10-14 DIAGNOSIS — E66.9 OBESITY, UNSPECIFIED: ICD-10-CM

## 2024-10-14 DIAGNOSIS — J44.9 CHRONIC OBSTRUCTIVE PULMONARY DISEASE, UNSPECIFIED: ICD-10-CM

## 2024-10-14 DIAGNOSIS — Z78.9 OTHER SPECIFIED HEALTH STATUS: ICD-10-CM

## 2024-10-14 DIAGNOSIS — J84.9 INTERSTITIAL PULMONARY DISEASE, UNSPECIFIED: ICD-10-CM

## 2024-10-14 PROCEDURE — 99204 OFFICE O/P NEW MOD 45 MIN: CPT

## 2024-10-14 RX ORDER — TAMSULOSIN HYDROCHLORIDE 0.4 MG/1
0.4 CAPSULE ORAL
Qty: 90 | Refills: 0 | Status: ACTIVE | COMMUNITY
Start: 2024-08-01

## 2024-10-14 RX ORDER — METFORMIN ER 500 MG 500 MG/1
500 TABLET ORAL
Qty: 90 | Refills: 0 | Status: ACTIVE | COMMUNITY
Start: 2023-09-29

## 2024-10-15 ENCOUNTER — APPOINTMENT (OUTPATIENT)
Dept: PULMONOLOGY | Facility: CLINIC | Age: 84
End: 2024-10-15
Payer: MEDICARE

## 2024-10-15 PROCEDURE — 94060 EVALUATION OF WHEEZING: CPT

## 2024-10-15 PROCEDURE — 94727 GAS DIL/WSHOT DETER LNG VOL: CPT

## 2024-10-15 PROCEDURE — 94729 DIFFUSING CAPACITY: CPT

## 2024-10-16 ENCOUNTER — APPOINTMENT (OUTPATIENT)
Dept: VASCULAR SURGERY | Facility: CLINIC | Age: 84
End: 2024-10-16
Payer: MEDICARE

## 2024-10-16 VITALS
SYSTOLIC BLOOD PRESSURE: 136 MMHG | WEIGHT: 189 LBS | BODY MASS INDEX: 33.49 KG/M2 | DIASTOLIC BLOOD PRESSURE: 68 MMHG | HEIGHT: 63 IN | HEART RATE: 59 BPM

## 2024-10-16 DIAGNOSIS — I65.21 OCCLUSION AND STENOSIS OF RIGHT CAROTID ARTERY: ICD-10-CM

## 2024-10-16 DIAGNOSIS — I70.213 ATHEROSCLEROSIS OF NATIVE ARTERIES OF EXTREMITIES WITH INTERMITTENT CLAUDICATION, BILATERAL LEGS: ICD-10-CM

## 2024-10-16 PROCEDURE — 99214 OFFICE O/P EST MOD 30 MIN: CPT

## 2024-10-16 PROCEDURE — 93880 EXTRACRANIAL BILAT STUDY: CPT

## 2024-10-29 ENCOUNTER — RX RENEWAL (OUTPATIENT)
Age: 84
End: 2024-10-29

## 2025-01-14 ENCOUNTER — APPOINTMENT (OUTPATIENT)
Dept: PULMONOLOGY | Facility: CLINIC | Age: 85
End: 2025-01-14
Payer: MEDICARE

## 2025-01-14 VITALS
WEIGHT: 188 LBS | HEIGHT: 63 IN | HEART RATE: 66 BPM | DIASTOLIC BLOOD PRESSURE: 60 MMHG | OXYGEN SATURATION: 98 % | BODY MASS INDEX: 33.31 KG/M2 | SYSTOLIC BLOOD PRESSURE: 110 MMHG

## 2025-01-14 DIAGNOSIS — J47.9 BRONCHIECTASIS, UNCOMPLICATED: ICD-10-CM

## 2025-01-14 DIAGNOSIS — G47.33 OBSTRUCTIVE SLEEP APNEA (ADULT) (PEDIATRIC): ICD-10-CM

## 2025-01-14 PROCEDURE — 99213 OFFICE O/P EST LOW 20 MIN: CPT

## 2025-01-14 PROCEDURE — G2211 COMPLEX E/M VISIT ADD ON: CPT

## 2025-01-16 ENCOUNTER — RESULT REVIEW (OUTPATIENT)
Age: 85
End: 2025-01-16

## 2025-01-21 ENCOUNTER — APPOINTMENT (OUTPATIENT)
Dept: PULMONOLOGY | Facility: CLINIC | Age: 85
End: 2025-01-21

## 2025-04-28 ENCOUNTER — APPOINTMENT (OUTPATIENT)
Dept: VASCULAR SURGERY | Facility: CLINIC | Age: 85
End: 2025-04-28
Payer: MEDICARE

## 2025-04-28 DIAGNOSIS — I65.21 OCCLUSION AND STENOSIS OF RIGHT CAROTID ARTERY: ICD-10-CM

## 2025-04-28 DIAGNOSIS — I70.213 ATHEROSCLEROSIS OF NATIVE ARTERIES OF EXTREMITIES WITH INTERMITTENT CLAUDICATION, BILATERAL LEGS: ICD-10-CM

## 2025-04-28 DIAGNOSIS — M48.062 SPINAL STENOSIS, LUMBAR REGION WITH NEUROGENIC CLAUDICATION: ICD-10-CM

## 2025-04-28 PROCEDURE — 99214 OFFICE O/P EST MOD 30 MIN: CPT

## 2025-04-28 PROCEDURE — 93922 UPR/L XTREMITY ART 2 LEVELS: CPT

## 2025-07-25 ENCOUNTER — RX RENEWAL (OUTPATIENT)
Age: 85
End: 2025-07-25

## 2025-07-29 ENCOUNTER — APPOINTMENT (OUTPATIENT)
Dept: PULMONOLOGY | Facility: CLINIC | Age: 85
End: 2025-07-29

## 2025-07-29 ENCOUNTER — APPOINTMENT (OUTPATIENT)
Dept: PULMONOLOGY | Facility: CLINIC | Age: 85
End: 2025-07-29
Payer: MEDICARE

## 2025-07-29 VITALS
SYSTOLIC BLOOD PRESSURE: 106 MMHG | WEIGHT: 193 LBS | BODY MASS INDEX: 34.2 KG/M2 | HEIGHT: 63 IN | DIASTOLIC BLOOD PRESSURE: 74 MMHG | HEART RATE: 62 BPM | OXYGEN SATURATION: 96 %

## 2025-07-29 DIAGNOSIS — J47.9 BRONCHIECTASIS, UNCOMPLICATED: ICD-10-CM

## 2025-07-29 DIAGNOSIS — J84.9 INTERSTITIAL PULMONARY DISEASE, UNSPECIFIED: ICD-10-CM

## 2025-07-29 DIAGNOSIS — J44.9 CHRONIC OBSTRUCTIVE PULMONARY DISEASE, UNSPECIFIED: ICD-10-CM

## 2025-07-29 PROCEDURE — 99204 OFFICE O/P NEW MOD 45 MIN: CPT

## 2025-07-29 PROCEDURE — G2211 COMPLEX E/M VISIT ADD ON: CPT

## 2025-07-29 RX ORDER — ALBUTEROL SULFATE 90 UG/1
108 (90 BASE) INHALANT RESPIRATORY (INHALATION)
Qty: 1 | Refills: 5 | Status: ACTIVE | COMMUNITY
Start: 2025-07-29 | End: 1900-01-01

## 2025-08-05 ENCOUNTER — NON-APPOINTMENT (OUTPATIENT)
Age: 85
End: 2025-08-05

## 2025-08-05 ENCOUNTER — APPOINTMENT (OUTPATIENT)
Dept: PULMONOLOGY | Facility: CLINIC | Age: 85
End: 2025-08-05
Payer: MEDICARE

## 2025-08-05 VITALS
WEIGHT: 188 LBS | DIASTOLIC BLOOD PRESSURE: 70 MMHG | HEART RATE: 55 BPM | BODY MASS INDEX: 33.31 KG/M2 | OXYGEN SATURATION: 97 % | SYSTOLIC BLOOD PRESSURE: 110 MMHG | HEIGHT: 63 IN

## 2025-08-05 DIAGNOSIS — G47.33 OBSTRUCTIVE SLEEP APNEA (ADULT) (PEDIATRIC): ICD-10-CM

## 2025-08-05 PROCEDURE — 99213 OFFICE O/P EST LOW 20 MIN: CPT
